# Patient Record
Sex: MALE | Race: WHITE | Employment: FULL TIME | URBAN - METROPOLITAN AREA
[De-identification: names, ages, dates, MRNs, and addresses within clinical notes are randomized per-mention and may not be internally consistent; named-entity substitution may affect disease eponyms.]

---

## 2022-11-03 ENCOUNTER — HOSPITAL ENCOUNTER (EMERGENCY)
Age: 22
Discharge: HOME | End: 2022-11-03
Attending: STUDENT IN AN ORGANIZED HEALTH CARE EDUCATION/TRAINING PROGRAM
Payer: COMMERCIAL

## 2022-11-03 VITALS
OXYGEN SATURATION: 99 % | DIASTOLIC BLOOD PRESSURE: 88 MMHG | BODY MASS INDEX: 35.55 KG/M2 | SYSTOLIC BLOOD PRESSURE: 140 MMHG | HEART RATE: 77 BPM | HEIGHT: 69 IN | WEIGHT: 240 LBS | TEMPERATURE: 98.2 F | RESPIRATION RATE: 18 BRPM

## 2022-11-03 DIAGNOSIS — S61.215A LACERATION OF LEFT RING FINGER WITHOUT FOREIGN BODY WITHOUT DAMAGE TO NAIL, INITIAL ENCOUNTER: Primary | ICD-10-CM

## 2022-11-03 PROCEDURE — 90471 IMMUNIZATION ADMIN: CPT | Performed by: STUDENT IN AN ORGANIZED HEALTH CARE EDUCATION/TRAINING PROGRAM

## 2022-11-03 PROCEDURE — 6370000000 HC RX 637 (ALT 250 FOR IP): Performed by: STUDENT IN AN ORGANIZED HEALTH CARE EDUCATION/TRAINING PROGRAM

## 2022-11-03 PROCEDURE — 6360000002 HC RX W HCPCS: Performed by: STUDENT IN AN ORGANIZED HEALTH CARE EDUCATION/TRAINING PROGRAM

## 2022-11-03 PROCEDURE — 90715 TDAP VACCINE 7 YRS/> IM: CPT | Performed by: STUDENT IN AN ORGANIZED HEALTH CARE EDUCATION/TRAINING PROGRAM

## 2022-11-03 PROCEDURE — 99284 EMERGENCY DEPT VISIT MOD MDM: CPT

## 2022-11-03 PROCEDURE — 12001 RPR S/N/AX/GEN/TRNK 2.5CM/<: CPT

## 2022-11-03 RX ORDER — GINSENG 100 MG
CAPSULE ORAL ONCE
Status: COMPLETED | OUTPATIENT
Start: 2022-11-03 | End: 2022-11-03

## 2022-11-03 RX ORDER — LIDOCAINE HYDROCHLORIDE AND EPINEPHRINE 10; 10 MG/ML; UG/ML
10 INJECTION, SOLUTION INFILTRATION; PERINEURAL ONCE
Status: DISCONTINUED | OUTPATIENT
Start: 2022-11-03 | End: 2022-11-03 | Stop reason: HOSPADM

## 2022-11-03 RX ADMIN — BACITRACIN: 500 OINTMENT TOPICAL at 08:42

## 2022-11-03 RX ADMIN — TETANUS TOXOID, REDUCED DIPHTHERIA TOXOID AND ACELLULAR PERTUSSIS VACCINE, ADSORBED 0.5 ML: 5; 2.5; 8; 8; 2.5 SUSPENSION INTRAMUSCULAR at 08:46

## 2022-11-03 ASSESSMENT — ENCOUNTER SYMPTOMS
RECTAL PAIN: 0
PHOTOPHOBIA: 0
BLOOD IN STOOL: 0
VOMITING: 0
APNEA: 0
NAUSEA: 0
BACK PAIN: 0
ABDOMINAL PAIN: 0

## 2022-11-03 ASSESSMENT — PAIN SCALES - GENERAL: PAINLEVEL_OUTOF10: 7

## 2022-11-03 ASSESSMENT — PAIN - FUNCTIONAL ASSESSMENT: PAIN_FUNCTIONAL_ASSESSMENT: 0-10

## 2022-11-03 NOTE — DISCHARGE INSTRUCTIONS
Please avoid scrubbing your finger for the first 24 hours. Do not soak your hand in bath or pool for the first 24 to 48 hours. He can apply bacitracin or Neosporin to the wound once a day if your bandage gets dirty or soaked. Return to the hospital if you notice any fever, swelling, pus draining from the finger or if you have any additional concerns. Follow-up with your doctor in 7 to 10 days to have the stitches removed which you had a total of 3 or you can come to the emergency department or urgent care.

## 2022-11-03 NOTE — ED PROVIDER NOTES
[address] [facility]          ATTENDING PHYSICIAN NOTE       Date of evaluation: 11/3/2022    Chief Complaint     Laceration (L fourth finger, cut on metal press )      History of Present Illness     Jeannine Juarez is a 20 y.o. male with no significant medical history who presents to the hospital for evaluation of a laceration to the left ring finger. The patient had a piece of metal and it cut posterior aspect of his left ring finger. This happened about an hour ago. He does not know when his last tetanus was. He was able to stop the bleeding with pressure. He is right-hand dominant. He denies any other injuries at this time. He is not on any blood thinners. He is able to move all his fingers without any difficulty. Rates his pain right now is a 7 out of 10. Review of Systems     Review of Systems   HENT:  Negative for congestion and ear discharge. Eyes:  Negative for photophobia and visual disturbance. Respiratory:  Negative for apnea. Cardiovascular:  Negative for chest pain. Gastrointestinal:  Negative for abdominal pain, blood in stool, nausea, rectal pain and vomiting. Endocrine: Negative for polydipsia. Genitourinary:  Negative for difficulty urinating. Musculoskeletal:  Negative for back pain. Skin:  Positive for wound. Negative for pallor. Neurological:  Negative for dizziness and syncope. Hematological:  Negative for adenopathy. All other systems reviewed and are negative. Past Medical, Surgical, Family, and Social History     He has no past medical history on file. He has no past surgical history on file. His family history is not on file. He reports that he has never smoked. He has never used smokeless tobacco. He reports that he does not drink alcohol and does not use drugs. Medications     Previous Medications    No medications on file       Allergies     He has No Known Allergies.     Physical Exam     INITIAL VITALS: BP: (!) 160/80, Temp: 98.2 °F (36.8 °C), Heart Rate: 77, Resp: 18, SpO2: 100 %   Physical Exam  Vitals reviewed. Constitutional:       Appearance: Normal appearance. He is well-developed. He is not ill-appearing or diaphoretic. HENT:      Head: Normocephalic and atraumatic. Eyes:      Pupils: Pupils are equal, round, and reactive to light. Cardiovascular:      Rate and Rhythm: Normal rate and regular rhythm. Heart sounds: Normal heart sounds. Pulmonary:      Effort: Pulmonary effort is normal.      Breath sounds: Normal breath sounds. Abdominal:      General: Bowel sounds are normal. There is no distension. Palpations: Abdomen is soft. Tenderness: There is no abdominal tenderness. Musculoskeletal:         General: No tenderness. Normal range of motion. Cervical back: Neck supple. Comments: Good capillary refill distally. Normal range of motion of all fingers with flexion and extension. Two-point discrimination intact. Skin:     Capillary Refill: Capillary refill takes less than 2 seconds. Comments: 8 mm laceration noted to the dorsal aspect of the left ring finger between the PIP and DIP joints. No visualization of tendon or vascular injury noted. Slight ooze coming from the wound. Neurological:      General: No focal deficit present. Mental Status: He is alert and oriented to person, place, and time. Psychiatric:         Mood and Affect: Mood normal.       Diagnostic Results       RADIOLOGY:  No orders to display       LABS:   No results found for this visit on 11/03/22. ED BEDSIDE ULTRASOUND:  No results found.     RECENT VITALS:  BP: (!) 160/80,Temp: 98.2 °F (36.8 °C), Heart Rate: 77, Resp: 18, SpO2: 100 %     Procedures     Lac Repair    Date/Time: 11/3/2022 8:05 AM  Performed by: Becky Madison MD  Authorized by: Becky Madison MD     Consent:     Consent obtained:  Verbal    Consent given by:  Patient    Risks, benefits, and alternatives were discussed: yes      Risks discussed:  Infection, pain, poor cosmetic result, poor wound healing, nerve damage, need for additional repair and vascular damage    Alternatives discussed:  No treatment  Universal protocol:     Patient identity confirmed:  Verbally with patient  Anesthesia:     Anesthesia method:  Nerve block    Block location:  Head of 2nd MCP    Block needle gauge:  25 G    Block anesthetic:  Lidocaine 2% WITH epi    Block injection procedure:  Anatomic landmarks identified  Laceration details:     Location:  Finger    Finger location:  L ring finger    Length (cm):  0.8    Depth (mm):  1  Pre-procedure details:     Preparation:  Patient was prepped and draped in usual sterile fashion  Exploration:     Limited defect created (wound extended): no      Hemostasis achieved with:  Direct pressure    Imaging outcome: foreign body not noted      Wound exploration: wound explored through full range of motion      Wound extent: no areolar tissue violation noted, no fascia violation noted, no foreign bodies/material noted, no muscle damage noted, no nerve damage noted, no tendon damage noted, no underlying fracture noted and no vascular damage noted      Contaminated: no    Treatment:     Area cleansed with:  Saline    Amount of cleaning:  Standard    Irrigation solution:  Sterile saline    Irrigation volume:  100    Irrigation method:  Pressure wash    Visualized foreign bodies/material removed: no      Debridement:  None    Undermining:  None    Scar revision: no    Skin repair:     Repair method:  Sutures    Suture size:  4-0    Suture material:  Prolene    Suture technique:  Simple interrupted    Number of sutures:  3  Approximation:     Approximation:  Close  Repair type:     Repair type:  Simple  Post-procedure details:     Dressing:  Antibiotic ointment and adhesive bandage    Procedure completion:  Tolerated well, no immediate complications      ED Course     Nursing Notes, Past Medical Hx, Past Surgical Hx, Social Hx,Allergies, and Family Hx were reviewed. patient was given the following medications:  Orders Placed This Encounter   Medications    lidocaine-EPINEPHrine 1 percent-1:[id number] injection 10 mL    DISCONTD: tetanus & diphtheria toxoids (adult) 5-2 LFU injection 0.5 mL    Tetanus-Diphth-Acell Pertussis (BOOSTRIX) injection 0.5 mL    bacitracin ointment       CONSULTS:  None    MEDICAL DECISIONMAKING / ASSESSMENT / Eliane Terrie is a 20 y.o. male who presented with a laceration from a metal.  He denies any crushing injury or high impact mechanism. The patient had his wound repaired with 3 stitches here in the department and tolerated this well. He is neurovascular intact at this time and does not require referral to a hand surgeon. He was given instructions to return to the hospital to have his stitches removed and sooner if he has any signs of infection or issues with the wound. Clinical Impression     1.  Laceration of left ring finger without foreign body without damage to nail, initial encounter        Disposition     PATIENT REFERRED TO:  Aminata Cook [facility] [address] [address] [phone]    Schedule an appointment as soon as possible for a visit in 1 week  For suture removal    DISCHARGE MEDICATIONS:  New Prescriptions    No medications on file       DISPOSITION Decision To Discharge 11/03/2022 08:47:09 AM         Simone Nina MD  11/03/22 [id number]

## 2022-11-03 NOTE — ED NOTES
Patient discharged in stable condition with workers compensation for and discharge summary.      Carmelo Ugalde RN  11/03/22 [id number]

## 2022-11-10 ENCOUNTER — HOSPITAL ENCOUNTER (EMERGENCY)
Age: 22
Discharge: HOME | End: 2022-11-10
Attending: EMERGENCY MEDICINE
Payer: COMMERCIAL

## 2022-11-10 VITALS
DIASTOLIC BLOOD PRESSURE: 79 MMHG | SYSTOLIC BLOOD PRESSURE: 138 MMHG | OXYGEN SATURATION: 100 % | TEMPERATURE: 98.7 F | RESPIRATION RATE: 16 BRPM

## 2022-11-10 DIAGNOSIS — Z48.02 VISIT FOR SUTURE REMOVAL: Primary | ICD-10-CM

## 2022-11-10 PROCEDURE — 6370000000 HC RX 637 (ALT 250 FOR IP): Performed by: EMERGENCY MEDICINE

## 2022-11-10 PROCEDURE — 99283 EMERGENCY DEPT VISIT LOW MDM: CPT

## 2022-11-10 RX ORDER — BACITRACIN ZINC AND POLYMYXIN B SULFATE 500; 1000 [USP'U]/G; [USP'U]/G
OINTMENT TOPICAL ONCE
Status: COMPLETED | OUTPATIENT
Start: 2022-11-10 | End: 2022-11-10

## 2022-11-10 RX ADMIN — BACITRACIN ZINC AND POLYMYXIN B SULFATE: 500; 10000 OINTMENT TOPICAL at 06:29

## 2022-11-10 ASSESSMENT — ENCOUNTER SYMPTOMS
RESPIRATORY NEGATIVE: 1
GASTROINTESTINAL NEGATIVE: 1

## 2022-11-10 NOTE — DISCHARGE INSTRUCTIONS
Keep the area covered while working, otherwise you can leave it open to air. Use antibiotics ointment over the area until the skin heals completely. Return to the emergency department for fevers, drainage from the wound, increased pain, or any other concerns.

## 2022-11-10 NOTE — ED PROVIDER NOTES
[address] [facility]          ATTENDING PHYSICIAN NOTE       Date of evaluation: 11/10/2022    Chief Complaint     Suture / Staple Removal      History of Present Illness     Maria M Maria is a 20 y.o. male who presents to the emergency department for suture removal.  Patient was seen in the emerge from 1 week ago for laceration to his left fourth digit. Patient had 3 sutures placed in the finger and was instructed to follow-up with primary care provider in 1 week, but was unable to get appointment so came to the emergency department. Patient denies any fevers or chills. He denies any drainage from the laceration. Review of Systems     Review of Systems   Constitutional: Negative. HENT: Negative. Respiratory: Negative. Cardiovascular: Negative. Gastrointestinal: Negative. Genitourinary: Negative. Musculoskeletal: Negative. Skin: Negative. Neurological: Negative. All other systems reviewed and are negative. Past Medical, Surgical, Family, and Social History     He has no past medical history on file. He has no past surgical history on file. His family history is not on file. He reports that he has never smoked. He has never used smokeless tobacco. He reports that he does not drink alcohol and does not use drugs. Medications     Previous Medications    No medications on file       Allergies     He has No Known Allergies. Physical Exam     INITIAL VITALS: BP: (!) 141/65, Temp: 98.7 °F (37.1 °C),  , Resp: 16, SpO2: 97 %   Physical Exam  Vitals and nursing note reviewed. Skin:     Comments: Healed wound overlying the extensor surface of the fourth middle phalanx with no erythema or warmth noted.        Diagnostic Results     RECENT VITALS:  BP: (!) 141/65,Temp: 98.7 °F (37.1 °C),  , Resp: 16, SpO2: 97 %     Procedures     N/A    ED Course     Nursing Notes, Past Medical Hx, Past Surgical Hx, Social Hx,Allergies, and Family Hx were reviewed. patient was given the following medications:  Orders Placed This Encounter   Medications    bacitracin-polymyxin b (POLYSPORIN) ointment       CONSULTS:  None    MEDICAL DECISIONMAKING / ASSESSMENT / Landy New is a 20 y.o. male presents for suture removal.  Patient has no evidence of infection on exam.  3 sutures were removed in the emerge from and without difficulty. There was a small amount of wound dehiscence noted so I did recommend the patient do dressings over this when working. Patient instructed to follow-up with primary care provider of choice as needed. Clinical Impression     1.  Visit for suture removal        Disposition     PATIENT REFERRED TO:  Primary care provider of your choice          DISCHARGE MEDICATIONS:  New Prescriptions    No medications on file       DISPOSITION Discharge - Pending Orders Complete 11/10/2022 06:27:53 AM         Quoc Mcdowell MD  11/10/22 [id number]